# Patient Record
(demographics unavailable — no encounter records)

---

## 2025-06-11 NOTE — PHYSICAL EXAM
[General Appearance - Alert] : alert [General Appearance - In No Acute Distress] : in no acute distress [Ankle Swelling (On Exam)] : not present [Varicose Veins Of Lower Extremities] : not present [2+] : right foot dorsalis pedis 2+ [No Joint Swelling] : no joint swelling [de-identified] : There is severe hallux abductovalgus deformity of the first metatarsal phalangeal joint bilaterally these deformities are painful in restrictive shoes.  Hammertoe deformities of digits 234 and 5 are noted bilaterally with a dislocated second metatarsal phalangeal joint of the right foot.  The dislocated second metatarsal phalangeal joint is exacerbating the pain and the formation of the hyperkeratotic lesion on the plantar aspect of the second metatarsal of the right foot. [Skin Color & Pigmentation] : normal skin color and pigmentation [] : no rash [Right Foot Was Examined] : The right foot was examined [Left Foot Was Examined] : The left foot was examined [Tenderness] : tender [FreeTextEntry1] : Plantar second metatarsal phalangeal joint [FreeTextEntry5] : Plantar 2nd and 3rd metatarsal phalangeal joint [Sensation] : the sensory exam was normal to light touch and pinprick [Deep Tendon Reflexes (DTR)] : deep tendon reflexes were 2+ and symmetric [No Focal Deficits] : no focal deficits [Oriented To Time, Place, And Person] : oriented to person, place, and time [Motor Exam] : the motor exam was normal [Affect] : the affect was normal [Impaired Insight] : insight and judgment were intact [Memory Recent] : recent memory was not impaired [Mood] : the mood was normal

## 2025-06-11 NOTE — REASON FOR VISIT
[Initial Visit] : an initial visit for [Foot Pain] : foot pain [Onychomycosis] : onychomycosis [FreeTextEntry2] : Patient seen today with longstanding complaint of severe foot deformities including hallux valgus deformity of the first metatarsal phalangeal joint hammertoe deformities of multiple lesser digits and a dislocated second metatarsal phalangeal joint.  Patient's longstanding deformity makes it very difficult for him to ambulate without pain and the patient states that he has been attempting to care for his feet on his own by debridement of lesions and dispersion padding which the patient applies to his feet daily in an effort to try to maintain a pain-free state.

## 2025-06-11 NOTE — PROCEDURE
[FreeTextEntry1] : Debridement of all hyperkeratotic lesions was accomplished today on the 2nd and 3rd metatarsal of both feet.  Debridement of mycotic/hypertrophic nails/dystrophic nails 1 through 5 bilateral.  The present orthotics are in good repair and the patient is comfortable with no complaints utilizing the orthotic support.  We have discussed the potential for surgical correction of the hallux valgus as well as hammertoe deformities of both feet in an effort to prevent the recurring intractable plantar keratomas which limit the patient's ambulation.  The patient is not considering surgical correction of deformity at this time.

## 2025-07-10 NOTE — PHYSICAL EXAM
[General Appearance - Alert] : alert [General Appearance - In No Acute Distress] : in no acute distress [Ankle Swelling (On Exam)] : not present [Ankle Swelling Bilaterally] : bilaterally  [Varicose Veins Of Lower Extremities] : bilaterally [2+] : left foot dorsalis pedis 2+ [No Joint Swelling] : no joint swelling [de-identified] : There is severe hallux abductovalgus deformity of the first metatarsal phalangeal joint bilaterally these deformities are painful in restrictive shoes.  Hammertoe deformities of digits 234 and 5 are noted bilaterally with a dislocated second metatarsal phalangeal joint of the right foot.  The dislocated second metatarsal phalangeal joint is exacerbating the pain and the formation of the hyperkeratotic lesion on the plantar aspect of the second metatarsal of the right foot. [Skin Color & Pigmentation] : normal skin color and pigmentation [] : no rash [Right Foot Was Examined] : The right foot was examined [Left Foot Was Examined] : The left foot was examined [Tenderness] : tender [FreeTextEntry1] : Plantar second metatarsal phalangeal joint [FreeTextEntry5] : Plantar 2nd and 3rd metatarsal phalangeal joint [Sensation] : the sensory exam was normal to light touch and pinprick [No Focal Deficits] : no focal deficits [Deep Tendon Reflexes (DTR)] : deep tendon reflexes were 2+ and symmetric [Motor Exam] : the motor exam was normal [Oriented To Time, Place, And Person] : oriented to person, place, and time [Impaired Insight] : insight and judgment were intact [Affect] : the affect was normal [Mood] : the mood was normal [Memory Recent] : recent memory was not impaired

## 2025-07-10 NOTE — HISTORY OF PRESENT ILLNESS
[FreeTextEntry1] : Patient presents today for follow-up visit regarding acutely painful thickened dystrophic mycotic nails 1 through 5 bilaterally.  The patient is unable to care for these nails on his own as his nails are thick and dystrophic.  The patient has no equipment at home to care for the present condition of these toenails.  Patient also complains of acutely painful plantar keratomas bilaterally which are the result of severe hallux abductovalgus and hammertoe deformities of both feet.  Patient states he needs care approximately every 4 weeks or he is unable to walk.  The patient does wear his orthotics on a regular basis and in addition uses padding on his feet from time to time as the orthotics are still inadequate to offload the painful lesions he develops.

## 2025-07-10 NOTE — PROCEDURE
[FreeTextEntry1] : Debridement of all hyperkeratotic lesions was accomplished today on the 2nd and 3rd metatarsal of both feet using a sharp sterile #10 blade.  Debridement of mycotic/hypertrophic nails/dystrophic nails 1 through 5 bilateral were accomplished using mechanical and electric means.  These nails were ground down thinner to try to relieve the bulk of the nail contributing to the patient's pain in close shoes.  The present orthotics are in good repair and the patient is comfortable with no complaints utilizing the current orthotic support.  We have discussed the potential for surgical correction of the hallux valgus as well as hammertoe deformities of both feet in an effort to prevent the recurring intractable plantar keratomas which limit the patient's ambulation.  The patient is not considering surgical correction of deformity at this time.